# Patient Record
Sex: FEMALE | Race: WHITE | Employment: OTHER | URBAN - NONMETROPOLITAN AREA
[De-identification: names, ages, dates, MRNs, and addresses within clinical notes are randomized per-mention and may not be internally consistent; named-entity substitution may affect disease eponyms.]

---

## 2021-02-17 ENCOUNTER — HOSPITAL ENCOUNTER (OUTPATIENT)
Dept: WOMENS IMAGING | Age: 66
Discharge: HOME | End: 2021-02-17
Payer: MEDICARE

## 2021-02-17 DIAGNOSIS — Z78.0 POSTMENOPAUSAL STATUS: ICD-10-CM

## 2021-02-17 DIAGNOSIS — Z12.31 VISIT FOR SCREENING MAMMOGRAM: ICD-10-CM

## 2021-02-17 PROCEDURE — 77063 BREAST TOMOSYNTHESIS BI: CPT

## 2021-02-17 PROCEDURE — 77080 DXA BONE DENSITY AXIAL: CPT

## 2021-10-20 ENCOUNTER — HOSPITAL ENCOUNTER (OUTPATIENT)
Dept: NON INVASIVE DIAGNOSTICS | Age: 66
Discharge: HOME | End: 2021-10-20
Payer: MEDICARE

## 2021-10-20 DIAGNOSIS — R01.1 SYSTOLIC MURMUR: ICD-10-CM

## 2021-10-20 LAB
LV EF: 60 %
LVEF MODALITY: NORMAL

## 2021-10-20 PROCEDURE — 93306 TTE W/DOPPLER COMPLETE: CPT

## 2022-05-05 ENCOUNTER — HOSPITAL ENCOUNTER (OUTPATIENT)
Dept: WOMENS IMAGING | Age: 67
Discharge: HOME | End: 2022-05-05
Payer: MEDICARE

## 2022-05-05 DIAGNOSIS — Z12.31 VISIT FOR SCREENING MAMMOGRAM: ICD-10-CM

## 2022-05-05 PROCEDURE — 77063 BREAST TOMOSYNTHESIS BI: CPT

## 2023-08-28 ENCOUNTER — HOSPITAL ENCOUNTER (OUTPATIENT)
Dept: WOMENS IMAGING | Age: 68
Discharge: HOME | End: 2023-08-28
Payer: MEDICARE

## 2023-08-28 VITALS — WEIGHT: 150 LBS | BODY MASS INDEX: 28.32 KG/M2 | HEIGHT: 61 IN

## 2023-08-28 DIAGNOSIS — Z12.31 VISIT FOR SCREENING MAMMOGRAM: ICD-10-CM

## 2023-08-28 PROCEDURE — 77063 BREAST TOMOSYNTHESIS BI: CPT

## 2024-08-29 ENCOUNTER — HOSPITAL ENCOUNTER (OUTPATIENT)
Dept: WOMENS IMAGING | Age: 69
Discharge: HOME | End: 2024-08-29
Payer: MEDICARE

## 2024-08-29 DIAGNOSIS — Z12.31 VISIT FOR SCREENING MAMMOGRAM: ICD-10-CM

## 2024-08-29 PROCEDURE — 77063 BREAST TOMOSYNTHESIS BI: CPT

## 2024-09-16 ENCOUNTER — HOSPITAL ENCOUNTER (OUTPATIENT)
Dept: WOMENS IMAGING | Age: 69
Discharge: HOME | End: 2024-09-16
Attending: RADIOLOGY
Payer: MEDICARE

## 2024-09-16 DIAGNOSIS — R92.8 ABNORMAL MAMMOGRAM: ICD-10-CM

## 2024-09-16 PROCEDURE — G0279 TOMOSYNTHESIS, MAMMO: HCPCS

## 2024-09-16 PROCEDURE — 76642 ULTRASOUND BREAST LIMITED: CPT

## 2024-09-19 ENCOUNTER — HOSPITAL ENCOUNTER (OUTPATIENT)
Dept: WOMENS IMAGING | Age: 69
Discharge: HOME | End: 2024-09-19
Attending: RADIOLOGY
Payer: MEDICARE

## 2024-09-19 DIAGNOSIS — N63.15 MASS OVERLAPPING MULTIPLE QUADRANTS OF RIGHT BREAST: ICD-10-CM

## 2024-09-19 DIAGNOSIS — N63.11 MASS OF UPPER OUTER QUADRANT OF RIGHT BREAST: ICD-10-CM

## 2024-09-19 DIAGNOSIS — R92.30 BREAST DENSITY: ICD-10-CM

## 2024-09-19 PROCEDURE — 2709999900 MAM STEREO BREAST BX W LOC DEVICE 1ST LESION RIGHT

## 2024-09-19 PROCEDURE — A4648 IMPLANTABLE TISSUE MARKER: HCPCS

## 2024-09-19 PROCEDURE — G0279 TOMOSYNTHESIS, MAMMO: HCPCS

## 2024-09-23 ENCOUNTER — CLINICAL DOCUMENTATION (OUTPATIENT)
Dept: WOMENS IMAGING | Age: 69
End: 2024-09-23

## 2024-09-25 DIAGNOSIS — D05.01 LOBULAR CARCINOMA IN SITU (LCIS) OF RIGHT BREAST: Primary | ICD-10-CM

## 2024-10-04 PROBLEM — M19.012 ARTHRITIS OF LEFT ACROMIOCLAVICULAR JOINT: Status: ACTIVE | Noted: 2024-10-04

## 2024-10-08 ENCOUNTER — HOSPITAL ENCOUNTER (OUTPATIENT)
Dept: MRI IMAGING | Age: 69
Discharge: HOME | End: 2024-10-08
Attending: SURGERY
Payer: MEDICARE

## 2024-10-08 DIAGNOSIS — D05.01 LOBULAR CARCINOMA IN SITU (LCIS) OF RIGHT BREAST: ICD-10-CM

## 2024-10-08 LAB — POC CREATININE WHOLE BLOOD: 1.2 MG/DL (ref 0.5–1.2)

## 2024-10-08 PROCEDURE — C8908 MRI W/O FOL W/CONT, BREAST,: HCPCS

## 2024-10-08 PROCEDURE — 82565 ASSAY OF CREATININE: CPT

## 2024-10-08 PROCEDURE — 6360000004 HC RX CONTRAST MEDICATION: Performed by: SURGERY

## 2024-10-08 PROCEDURE — A9579 GAD-BASE MR CONTRAST NOS,1ML: HCPCS | Performed by: SURGERY

## 2024-10-08 RX ADMIN — GADOTERIDOL 15 ML: 279.3 INJECTION, SOLUTION INTRAVENOUS at 09:17

## 2024-10-09 NOTE — PROGRESS NOTES
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 Posterior depth.     HISTORY: 68 years, Female, Lobular carcinoma in situ of right breast     Tyrer-Cuzick calculation reports this patient's lifetime risk is 31.18%.     COMPARISON: 9/19/2024, 9/16/2024, 8/19/2024.     TECHNIQUE: Axial and coronal T2 STIR, sagittal T1, axial T1 fat saturated pre  and postcontrast dynamic imaging was performed of both breasts. Intravenous  ProHance gadolinium was given. Images were reviewed on a separate dedicated 3-D  workstation and time intensity curves were analyzed.     FINDINGS:     Amount of Fibroglandular Tissue: The breast parenchyma has scattered  fibroglandular elements.  Background Parenchymal Enhancement: Minimal     Right breast: The coil biopsy clip is identified in the upper outer right  breast, posterior depth. This is best seen on the axial STIR sequence. At this  site, there is no abnormal enhancement. There is no mass lesion. There are no  suspicious findings. The barbell clip is seen in the upper outer right breast  more anteriorly. There is no abnormal enhancement at this site. There are no  areas of suspicious enhancement. There is no suspicious morphology. No dominant  lesions are present.     Left breast: There are no areas of suspicious enhancement. There is no  suspicious morphology. No dominant lesions are present.     Right axillary lymph nodes are within acceptable limits. There are mildly  enlarged left axillary lymph nodes. Multiple prominent left axillary lymph nodes  are seen. These are best seen on the axial STIR sequence.      There are severe degenerative changes in both shoulders. There are bilateral  shoulder joint effusions. There is bone marrow edema in the glenohumeral joints  bilaterally. The patient was scanned with her arms down.           IMPRESSION:     1. No abnormal enhancement in either

## 2024-10-10 ENCOUNTER — OFFICE VISIT (OUTPATIENT)
Dept: SURGERY | Age: 69
End: 2024-10-10
Payer: MEDICARE

## 2024-10-10 ENCOUNTER — HOSPITAL ENCOUNTER (OUTPATIENT)
Age: 69
Discharge: HOME | End: 2024-10-10
Payer: MEDICARE

## 2024-10-10 ENCOUNTER — TELEPHONE (OUTPATIENT)
Dept: SURGERY | Age: 69
End: 2024-10-10

## 2024-10-10 ENCOUNTER — HOSPITAL ENCOUNTER (OUTPATIENT)
Dept: WOMENS IMAGING | Age: 69
Discharge: HOME | End: 2024-10-10
Attending: RADIOLOGY
Payer: MEDICARE

## 2024-10-10 VITALS
SYSTOLIC BLOOD PRESSURE: 124 MMHG | DIASTOLIC BLOOD PRESSURE: 70 MMHG | OXYGEN SATURATION: 96 % | RESPIRATION RATE: 18 BRPM | HEIGHT: 61 IN | BODY MASS INDEX: 26.81 KG/M2 | HEART RATE: 88 BPM | TEMPERATURE: 97.3 F | WEIGHT: 142 LBS

## 2024-10-10 DIAGNOSIS — R92.8 ABNORMAL FINDING ON BREAST IMAGING: ICD-10-CM

## 2024-10-10 DIAGNOSIS — E11.69 TYPE 2 DIABETES MELLITUS WITH OTHER SPECIFIED COMPLICATION, WITHOUT LONG-TERM CURRENT USE OF INSULIN (HCC): ICD-10-CM

## 2024-10-10 DIAGNOSIS — I10 ESSENTIAL HYPERTENSION: ICD-10-CM

## 2024-10-10 DIAGNOSIS — D05.01 PLEOMORPHIC LOBULAR CARCINOMA IN SITU (LCIS) OF RIGHT BREAST: ICD-10-CM

## 2024-10-10 DIAGNOSIS — D05.01 PLEOMORPHIC LOBULAR CARCINOMA IN SITU (LCIS) OF RIGHT BREAST: Primary | ICD-10-CM

## 2024-10-10 DIAGNOSIS — R59.0 ENLARGED LYMPH NODES IN ARMPIT: ICD-10-CM

## 2024-10-10 LAB
ANION GAP SERPL CALC-SCNC: 17 MEQ/L (ref 8–16)
BUN SERPL-MCNC: 25 MG/DL (ref 7–22)
CALCIUM SERPL-MCNC: 9.9 MG/DL (ref 8.5–10.5)
CHLORIDE SERPL-SCNC: 97 MEQ/L (ref 98–111)
CO2 SERPL-SCNC: 24 MEQ/L (ref 23–33)
CREAT SERPL-MCNC: 1 MG/DL (ref 0.4–1.2)
EKG ATRIAL RATE: 84 BPM
EKG P AXIS: 52 DEGREES
EKG P-R INTERVAL: 136 MS
EKG Q-T INTERVAL: 356 MS
EKG QRS DURATION: 74 MS
EKG QTC CALCULATION (BAZETT): 420 MS
EKG R AXIS: 48 DEGREES
EKG T AXIS: 79 DEGREES
EKG VENTRICULAR RATE: 84 BPM
GFR SERPL CREATININE-BSD FRML MDRD: 61 ML/MIN/1.73M2
GLUCOSE SERPL-MCNC: 157 MG/DL (ref 70–108)
HCT VFR BLD AUTO: 42.8 % (ref 37–47)
HGB BLD-MCNC: 13.5 GM/DL (ref 12–16)
POTASSIUM SERPL-SCNC: 4.9 MEQ/L (ref 3.5–5.2)
SODIUM SERPL-SCNC: 138 MEQ/L (ref 135–145)

## 2024-10-10 PROCEDURE — 85018 HEMOGLOBIN: CPT

## 2024-10-10 PROCEDURE — 99205 OFFICE O/P NEW HI 60 MIN: CPT | Performed by: SURGERY

## 2024-10-10 PROCEDURE — 1123F ACP DISCUSS/DSCN MKR DOCD: CPT | Performed by: SURGERY

## 2024-10-10 PROCEDURE — 80048 BASIC METABOLIC PNL TOTAL CA: CPT

## 2024-10-10 PROCEDURE — 93005 ELECTROCARDIOGRAM TRACING: CPT

## 2024-10-10 PROCEDURE — 85014 HEMATOCRIT: CPT

## 2024-10-10 PROCEDURE — 3078F DIAST BP <80 MM HG: CPT | Performed by: SURGERY

## 2024-10-10 PROCEDURE — 36415 COLL VENOUS BLD VENIPUNCTURE: CPT

## 2024-10-10 PROCEDURE — 3074F SYST BP LT 130 MM HG: CPT | Performed by: SURGERY

## 2024-10-10 PROCEDURE — 76882 US LMTD JT/FCL EVL NVASC XTR: CPT

## 2024-10-10 RX ORDER — GLIMEPIRIDE 2 MG/1
2 TABLET ORAL
COMMUNITY

## 2024-10-10 RX ORDER — LISINOPRIL AND HYDROCHLOROTHIAZIDE 20; 25 MG/1; MG/1
1 TABLET ORAL DAILY
COMMUNITY
Start: 2024-08-08

## 2024-10-10 ASSESSMENT — ENCOUNTER SYMPTOMS: GASTROINTESTINAL NEGATIVE: 1

## 2024-10-10 NOTE — TELEPHONE ENCOUNTER
Patient scheduled for surgery with Dr. Ott on 10- at 1:00pm with an arrival of 10:00am at [facility].  Preop orders (H/H, BMP, EKG) and surgery instructions given.  Surgery consent signed.

## 2024-10-10 NOTE — TELEPHONE ENCOUNTER
Surgery Scheduling Form   [facility] [address], [place] [address]    Phone * [phone]   Surgical Scheduling Direct line Phone * [phone]  Fax * [phone]      Rachna Sanabria      1955    female    [address] [address]  Marital Status:         Home Phone: [phone]   Cell Phone:   Telephone Information:   Mobile [phone]              Surgeon: Dr. Ott  Surgery Date:10-   Time: TF Marva     Procedure: Right breast lumpectomy with preop needle localization     Outpatient     Diagnosis: Right breast lobular carcinoma in situ    Important Medical History: In Epic    Special Inst/Equip:     CPT Codes: 84112    Latex Allergy:   no Cardiac Device:  no    Anesthesia Type: MAC    Case Location:  Main OR     Preadmission Testing: Phone Call      PAT Date and Time: ________________________________    PAT Confirmation #: _________________________________    Post Op Visit:  ______________________________________    Need Preop Cardiac Clearance:   no    Does patient have Cardiologist/physician? No      Surgery Conformation #:  ______________________________________________    : __________________________________ Date:____________________        Insurance Company Name:  [employer]

## 2024-10-10 NOTE — TELEPHONE ENCOUNTER
Per [employer]    No Authorization Required  Place of Service  22 - On [facility]  Service From - To Date  NA    Admission Type  9    Diagnosis Code 1   - Lobular carcinoma in situ of right breast    Procedure Code 1  99464  Quantity  1 Units  Procedure From - To Date  2024-10-16    Status  NO AUTH REQUIRED  Message  No precert required.

## 2024-10-14 ENCOUNTER — PREP FOR PROCEDURE (OUTPATIENT)
Dept: SURGERY | Age: 69
End: 2024-10-14

## 2024-10-14 ENCOUNTER — SOCIAL WORK (OUTPATIENT)
Dept: INFUSION THERAPY | Age: 69
End: 2024-10-14

## 2024-10-14 RX ORDER — SODIUM CHLORIDE 9 MG/ML
INJECTION, SOLUTION INTRAVENOUS CONTINUOUS
Status: CANCELLED | OUTPATIENT
Start: 2024-10-14

## 2024-10-14 NOTE — PROGRESS NOTES
Oncology Social Work    Date: 10/14/2024  Time: 11:47 AM  Name: Rachna Sanabria  MRN: [id number]     Contact Type: Follow-up    Note:   Situation: This staff called Rachna Sanabria via phone support to introduce myself as her Oncology Social Worker.     Background:  Rachna was referred to this staff by the General Surgery Dept since she had her recent appointment there. This staff was calling to review her outstanding concerns she may have identified on her Distress Thermometer.     Assessment: This staff had the opportunity to speak with Rachna. She appeared very pleasant as we discussed the services that are provided by the , which include Medicaid, Medicare applications., FMLA paperwork, or Disability applications.    - I also went on to explain, I can assist in getting her Power of  documents completed when she comes in for a future appt at any of her providers office, when in the area, so she doesn't need to make a special trip.   - No community referrals were placed now, but we will remain open to opportunities should she pursue that avenue.    Recommendation: Follow-up will be initiated by Rachna based on need.  provided her with my contact information and will remain available for support.        JASMINA Morales, LANCE, MARLENE  Oncology Social Worker      Electronically signed by JASMINA Morales, LANCE, MARLENE on 10/14/2024 at 11:47 AM

## 2024-10-16 ENCOUNTER — ANESTHESIA (OUTPATIENT)
Dept: OPERATING ROOM | Age: 69
End: 2024-10-16
Payer: MEDICARE

## 2024-10-16 ENCOUNTER — ANESTHESIA EVENT (OUTPATIENT)
Dept: OPERATING ROOM | Age: 69
End: 2024-10-16
Payer: MEDICARE

## 2024-10-16 ENCOUNTER — HOSPITAL ENCOUNTER (OUTPATIENT)
Dept: WOMENS IMAGING | Age: 69
Discharge: HOME | End: 2024-10-16
Payer: MEDICARE

## 2024-10-16 ENCOUNTER — HOSPITAL ENCOUNTER (OUTPATIENT)
Age: 69
Setting detail: OUTPATIENT SURGERY
Discharge: HOME | End: 2024-10-16
Attending: SURGERY | Admitting: SURGERY
Payer: MEDICARE

## 2024-10-16 ENCOUNTER — HOSPITAL ENCOUNTER (OUTPATIENT)
Dept: WOMENS IMAGING | Age: 69
Discharge: HOME | End: 2024-10-16
Attending: SURGERY
Payer: MEDICARE

## 2024-10-16 VITALS
HEIGHT: 60 IN | HEART RATE: 88 BPM | OXYGEN SATURATION: 93 % | SYSTOLIC BLOOD PRESSURE: 147 MMHG | BODY MASS INDEX: 27.41 KG/M2 | DIASTOLIC BLOOD PRESSURE: 80 MMHG | RESPIRATION RATE: 16 BRPM | TEMPERATURE: 98.1 F | WEIGHT: 139.6 LBS

## 2024-10-16 DIAGNOSIS — D05.01 PLEOMORPHIC LOBULAR CARCINOMA IN SITU (LCIS) OF RIGHT BREAST: ICD-10-CM

## 2024-10-16 DIAGNOSIS — D05.01: ICD-10-CM

## 2024-10-16 DIAGNOSIS — D05.01 LOBULAR CARCINOMA IN SITU OF RIGHT BREAST: ICD-10-CM

## 2024-10-16 PROCEDURE — 3600000012 HC SURGERY LEVEL 2 ADDTL 15MIN: Performed by: SURGERY

## 2024-10-16 PROCEDURE — 6360000002 HC RX W HCPCS

## 2024-10-16 PROCEDURE — 88307 TISSUE EXAM BY PATHOLOGIST: CPT

## 2024-10-16 PROCEDURE — 7100000000 HC PACU RECOVERY - FIRST 15 MIN: Performed by: SURGERY

## 2024-10-16 PROCEDURE — 3700000001 HC ADD 15 MINUTES (ANESTHESIA): Performed by: SURGERY

## 2024-10-16 PROCEDURE — 2709999900 HC NON-CHARGEABLE SUPPLY: Performed by: SURGERY

## 2024-10-16 PROCEDURE — 2580000003 HC RX 258: Performed by: NURSE PRACTITIONER

## 2024-10-16 PROCEDURE — 7100000001 HC PACU RECOVERY - ADDTL 15 MIN: Performed by: SURGERY

## 2024-10-16 PROCEDURE — 3600000002 HC SURGERY LEVEL 2 BASE: Performed by: SURGERY

## 2024-10-16 PROCEDURE — 2500000003 HC RX 250 WO HCPCS

## 2024-10-16 PROCEDURE — 19285 PERQ DEV BREAST 1ST US IMAG: CPT

## 2024-10-16 PROCEDURE — G0279 TOMOSYNTHESIS, MAMMO: HCPCS

## 2024-10-16 PROCEDURE — 88305 TISSUE EXAM BY PATHOLOGIST: CPT

## 2024-10-16 PROCEDURE — 6360000002 HC RX W HCPCS: Performed by: NURSE PRACTITIONER

## 2024-10-16 PROCEDURE — 76098 X-RAY EXAM SURGICAL SPECIMEN: CPT

## 2024-10-16 PROCEDURE — 7100000010 HC PHASE II RECOVERY - FIRST 15 MIN: Performed by: SURGERY

## 2024-10-16 PROCEDURE — 7100000011 HC PHASE II RECOVERY - ADDTL 15 MIN: Performed by: SURGERY

## 2024-10-16 PROCEDURE — 3700000000 HC ANESTHESIA ATTENDED CARE: Performed by: SURGERY

## 2024-10-16 RX ORDER — ONDANSETRON 2 MG/ML
INJECTION INTRAMUSCULAR; INTRAVENOUS
Status: DISCONTINUED | OUTPATIENT
Start: 2024-10-16 | End: 2024-10-16 | Stop reason: SDUPTHER

## 2024-10-16 RX ORDER — FENTANYL CITRATE 50 UG/ML
INJECTION, SOLUTION INTRAMUSCULAR; INTRAVENOUS
Status: DISCONTINUED | OUTPATIENT
Start: 2024-10-16 | End: 2024-10-16 | Stop reason: SDUPTHER

## 2024-10-16 RX ORDER — DEXAMETHASONE SODIUM PHOSPHATE 10 MG/ML
INJECTION, EMULSION INTRAMUSCULAR; INTRAVENOUS
Status: DISCONTINUED | OUTPATIENT
Start: 2024-10-16 | End: 2024-10-16 | Stop reason: SDUPTHER

## 2024-10-16 RX ORDER — ROCURONIUM BROMIDE 10 MG/ML
INJECTION, SOLUTION INTRAVENOUS
Status: DISCONTINUED | OUTPATIENT
Start: 2024-10-16 | End: 2024-10-16 | Stop reason: SDUPTHER

## 2024-10-16 RX ORDER — SODIUM CHLORIDE 9 MG/ML
INJECTION, SOLUTION INTRAVENOUS CONTINUOUS
Status: DISCONTINUED | OUTPATIENT
Start: 2024-10-16 | End: 2024-10-16 | Stop reason: HOSPADM

## 2024-10-16 RX ORDER — HYDROCODONE BITARTRATE AND ACETAMINOPHEN 5; 325 MG/1; MG/1
1 TABLET ORAL EVERY 6 HOURS PRN
Qty: 12 TABLET | Refills: 0 | Status: SHIPPED | OUTPATIENT
Start: 2024-10-16 | End: 2024-10-19

## 2024-10-16 RX ORDER — PHENYLEPHRINE HCL IN 0.9% NACL 1 MG/10 ML
SYRINGE (ML) INTRAVENOUS
Status: DISCONTINUED | OUTPATIENT
Start: 2024-10-16 | End: 2024-10-16 | Stop reason: SDUPTHER

## 2024-10-16 RX ORDER — MIDAZOLAM HYDROCHLORIDE 1 MG/ML
INJECTION INTRAMUSCULAR; INTRAVENOUS
Status: DISCONTINUED | OUTPATIENT
Start: 2024-10-16 | End: 2024-10-16 | Stop reason: SDUPTHER

## 2024-10-16 RX ORDER — PROPOFOL 10 MG/ML
INJECTION, EMULSION INTRAVENOUS
Status: DISCONTINUED | OUTPATIENT
Start: 2024-10-16 | End: 2024-10-16 | Stop reason: SDUPTHER

## 2024-10-16 RX ADMIN — Medication 200 MCG: at 14:26

## 2024-10-16 RX ADMIN — FENTANYL CITRATE 25 MCG: 50 INJECTION, SOLUTION INTRAMUSCULAR; INTRAVENOUS at 15:14

## 2024-10-16 RX ADMIN — FENTANYL CITRATE 50 MCG: 50 INJECTION, SOLUTION INTRAMUSCULAR; INTRAVENOUS at 15:10

## 2024-10-16 RX ADMIN — SODIUM CHLORIDE: 9 INJECTION, SOLUTION INTRAVENOUS at 11:51

## 2024-10-16 RX ADMIN — ONDANSETRON 4 MG: 2 INJECTION INTRAMUSCULAR; INTRAVENOUS at 14:50

## 2024-10-16 RX ADMIN — PROPOFOL 50 MG: 10 INJECTION, EMULSION INTRAVENOUS at 14:12

## 2024-10-16 RX ADMIN — FENTANYL CITRATE 25 MCG: 50 INJECTION, SOLUTION INTRAMUSCULAR; INTRAVENOUS at 14:23

## 2024-10-16 RX ADMIN — SUGAMMADEX 200 MG: 100 INJECTION, SOLUTION INTRAVENOUS at 15:10

## 2024-10-16 RX ADMIN — PROPOFOL 100 MG: 10 INJECTION, EMULSION INTRAVENOUS at 14:23

## 2024-10-16 RX ADMIN — WATER 2000 MG: 1 INJECTION INTRAMUSCULAR; INTRAVENOUS; SUBCUTANEOUS at 14:13

## 2024-10-16 RX ADMIN — DEXAMETHASONE SODIUM PHOSPHATE 10 MG: 10 INJECTION, EMULSION INTRAMUSCULAR; INTRAVENOUS at 14:38

## 2024-10-16 RX ADMIN — ROCURONIUM BROMIDE 50 MG: 10 INJECTION INTRAVENOUS at 14:23

## 2024-10-16 RX ADMIN — PROPOFOL 50 MCG/KG/MIN: 10 INJECTION, EMULSION INTRAVENOUS at 14:13

## 2024-10-16 RX ADMIN — MIDAZOLAM 2 MG: 1 INJECTION INTRAMUSCULAR; INTRAVENOUS at 14:07

## 2024-10-16 RX ADMIN — Medication 200 MCG: at 14:33

## 2024-10-16 ASSESSMENT — PAIN - FUNCTIONAL ASSESSMENT
PAIN_FUNCTIONAL_ASSESSMENT: 0-10
PAIN_FUNCTIONAL_ASSESSMENT: NONE - DENIES PAIN

## 2024-10-16 ASSESSMENT — ENCOUNTER SYMPTOMS: GASTROINTESTINAL NEGATIVE: 1

## 2024-10-16 NOTE — ANESTHESIA PRE PROCEDURE
Department of Anesthesiology  Preprocedure Note       Name:  Rachna Sanabria   Age:  68 y.o.  :  1955                                          MRN:  [id number]         Date:  10/16/2024      Surgeon: Surgeon(s):  Amena Ott MD    Procedure: Procedure(s):  RIGHT BREAST LUMPECTOMY W/ PREOP NEEDLE LOCALIZATION    Medications prior to admission:   Prior to Admission medications    Medication Sig Start Date End Date Taking? Authorizing Provider   lisinopril-hydroCHLOROthiazide (PRINZIDE;ZESTORETIC) 20-25 MG per tablet Take 1 tablet by mouth daily 24   ProviderNayely MD   glimepiride (AMARYL) 2 MG tablet Take 1 tablet by mouth every morning (before breakfast)    Provider, MD Nayely       Current medications:    Current Facility-Administered Medications   Medication Dose Route Frequency Provider Last Rate Last Admin   • 0.9 % sodium chloride infusion   IntraVENous Continuous Mercy Ocasio APRN - CNP       • ceFAZolin (ANCEF) 2,000 mg in sterile water 20 mL IV syringe  2,000 mg IntraVENous On Call to OR Mercy Ocasio APRN - CNP           Allergies:    Allergies   Allergen Reactions   • Codeine Hives and Anxiety       Problem List:    Patient Active Problem List   Diagnosis Code   • Arthritis of left acromioclavicular joint M19.012       Past Medical History:        Diagnosis Date   • Leukemia (HCC)    • Lobular carcinoma in situ            Past Surgical History:        Procedure Laterality Date   • CHOLECYSTECTOMY     • FINGER AMPUTATION Left     re-attached   • JENNIFFER STEROTACTIC LOC BREAST BIOPSY RIGHT Right 2024    [facility] STEROTACTIC LOC BREAST BIOPSY RIGHT 2024 [facility]   • [facility] US GUID NDL BIOPSY RIGHT Right 2024    [facility] US GUID NDL BIOPSY RIGHT 2024 Danielito Zimmerman MD [facility]       Social History:    Social History     Tobacco Use   • Smoking status: Former   • Smokeless tobacco: Never   Substance Use Topics   • Alcohol use:

## 2024-10-16 NOTE — H&P
Amena Ott MD  General Surgery Clinic H&P    Pt Name: Rachna Sanabria  MRN: [id number]  YOB: 1955  Date of evaluation: 10/10/2024    Primary Care Physician: Corona Odom MD  Patient evaluated at the request of  womens wellness  Reason for evaluation: Pleomorphic LCIS  IMPRESSIONS:       ICD-10-CM    1. Pleomorphic lobular carcinoma in situ (LCIS) of right breast  D05.01 Basic Metabolic Panel     Hemoglobin and Hematocrit     EKG 12 Lead     JENNIFFER US GUIDED PLACE LOC DEVICE PERC 1ST LESION      2. Essential hypertension  I10 Basic Metabolic Panel     Hemoglobin and Hematocrit     EKG 12 Lead      3. Type 2 diabetes mellitus with other specified complication, without long-term current use of insulin (HCC)  E11.69 Basic Metabolic Panel     Hemoglobin and Hematocrit     EKG 12 Lead        does not have any pertinent problems on file.    PLANS:   I discussed with the patient assessment, discussed reason for excision with LCIS and scenario.  Discussed with her that it does provide increased risk of bilateral breast cancers just having LCIS and discussed potential of having chemoprophylaxis.  I discussed with patient surgery how was performed, the risks of benefits the alternatives risk including but not limited to bleeding infection demonstrating structures need for procedures patient understands like to proceed with needle localized lumpectomy.  Patient will follow-up with Dr. Cobb to discuss chemoprophylaxis.  Patient is established with her ongoing treatment for CLL    SUBJECTIVE:   CC: Breast lesion    History of Chief Complaint:    Rachna is a 68 y.o.female who comes in today for evaluation of recent screening mammogram that found right breast calcifications biopsy consistent with LCIS with pleomorphic features.  Patient has not noticed any palpable masses any lesions.      right breast 10 o'clock position 2 cm from  the nipple measuring 0.9 x 0.3 x 0.4 cm      Patient has no

## 2024-10-16 NOTE — ANESTHESIA POSTPROCEDURE EVALUATION
Department of Anesthesiology  Postprocedure Note    Patient: Rachna Sanabria  MRN: [id number]  YOB: 1955  Date of evaluation: 10/16/2024    Procedure Summary       Date: 10/16/24 Room / Location: [facility] OR 03 / [facility] OR    Anesthesia Start: 1406 Anesthesia Stop: 1518    Procedure: RIGHT BREAST LUMPECTOMY W/ PREOP NEEDLE LOCALIZATION (Right: Breast) Diagnosis:       Lobular carcinoma in situ of right breast      (Lobular carcinoma in situ of right breast [D05.01])    Surgeons: Amena Ott MD Responsible Provider: Aamir Garcia MD    Anesthesia Type: general ASA Status: 2            Anesthesia Type: No value filed.    Desire Phase I: Desire Score: 9    Desire Phase II:      Anesthesia Post Evaluation    Patient location during evaluation: PACU  Patient participation: complete - patient participated  Level of consciousness: awake  Airway patency: patent  Nausea & Vomiting: no vomiting and no nausea  Cardiovascular status: hemodynamically stable  Respiratory status: acceptable  Hydration status: stable  Pain management: adequate    No notable events documented.

## 2024-10-16 NOTE — PROGRESS NOTES
Contact Type:  [facility]    Contact Information: Arrived with  for needle localization. Having breast surgery today with Dr. Ott.    Diagnosis: LCIS    Patient Expresses Need(s) For: n/a     Teaching Sheets/Booklets Provided: n/a    Notes: Procedure explained and questions addressed as needed.  Dr. Zimmerman placed wire. Secured with gauze and tape per protocol. Transported patient with belongings to [facility] via wheelchair at 1115.

## 2024-10-16 NOTE — PROGRESS NOTES

## 2024-10-16 NOTE — PROGRESS NOTES
1513 - Pt arrives to pacu at this time. Pt is awake and alert. Denies pain or nausea. Respirs easy and unlabored on 6L simple mask. VSS.     1520 - Placed on 4L NC. Pt continues to deny needs. VSS.    1530 - NC decreased to 2L NC. Pt states pain is tolerable at this time. Denies needs. VSS.    1540 - Pt continues to deny needs. VSS.    1543 - Pt meets criteria to discharge from pacu at this time. Pt states pain is tolerable. Respirs unlabored on 2L NC. VSS. [facility]. Family at bedside.

## 2024-10-16 NOTE — PROGRESS NOTES
Pt returned to [facility] room 12. Vitals and assessment as charted. 0.9 infusing, \to count from PACU. Pt has crackers and water. Family at the bedside. Pt and family verbalized understanding of discharge criteria and call light use. Call light in reach.

## 2024-10-16 NOTE — PROGRESS NOTES
Pt admitted to [facility] room 12 and oriented to unit. SCD sleeves applied. Nares swabbed. Pt verbalized permission for first name, last initial and physicians name on white board. SDS board and discharge criteria explained, pt and family verbalized understanding. Pt denies thoughts of harming self or others. Call light in reach. Family at the bedside.  Carlos [phone]

## 2024-10-16 NOTE — DISCHARGE INSTRUCTIONS
DR. CHAU'S DISCHARGE INSTRUCTIONS    Pt Name: Rachna Sanabria  Medical Record Number: [id number]  Today's Date: 10/16/2024  GENERAL ANESTHESIA OR SEDATION   1. Do not drive or operate hazardous machinery for 24 hours.  2. Do not make important business or personal decisions for 24 hours.  3. Do not drink alcoholic beverages or use tobacco for 24 hours.  ACTIVITY INSTRUCTIONS   Rest today. Resume light to normal activity tomorrow.  You may resume normal activity tomorrow. Do not engage in strenuous activity that may place stress on your incision.  Do not drive for 3-5 days and avoid heavy lifting, tugging, pullings greater than 10-20 lbs until seen in the office.    DIET INSTRUCTIONS   Begin with clear liquids. If not nauseated, may increase to a low-fat diet when you desire. Greasy and spicy foods are not advised.  Regular diet as tolerated.  MEDICATIONS   Prescription written for norco. Take as directed.  Do not drink alcohol or drive while taking pain medications. You may experience dizziness or drowsiness with these medications. You may also experience constipation which can be relieved with stool softners or laxatives.  You may resume your daily prescription medication schedule unless otherwise specified.  Do not take 325mg Aspirin or other blood thinners such as Coumadin or Plavix for 5 days.  WOUND & DRESSING INSTRUCTIONS   Always ensure you and your care giver clean hands before and after caring for the wound.  Keep dressing clean and dry for 48 hours. Change when soiled or wet.      Allow steri-strips to fall off on their own if present, allow surgical glue to peel off on its own  Ice operative site for 20 minutes 4 times a day.     May wash over incision in shower in 48 hours, but do not soak in a bath.    BREAST PROCEDURES   Following breast procedures it is important to use supportive garments  It is also normal with sentinel lymph node biopsy for the urine to have a bluish color.     FOLLOW

## 2024-10-17 ENCOUNTER — TELEPHONE (OUTPATIENT)
Dept: SURGERY | Age: 69
End: 2024-10-17

## 2024-10-17 NOTE — OP NOTE
Operative Note      Patient: Rachna Sanabria  YOB: 1955  MRN: [id number]    Date of Procedure: 10/16/2024    Pre-Op Diagnosis Codes:      * Lobular carcinoma in situ of right breast [D05.01]    Post-Op Diagnosis: Same       Procedure(s):  RIGHT BREAST LUMPECTOMY W/ PREOP NEEDLE LOCALIZATION    Surgeon(s):  Amena Ott MD    Assistant:   First Assistant: Jomar Miller RN    Anesthesia: Monitor Anesthesia Care    Estimated Blood Loss (mL): 15 ml    Complications: None    Specimens:   ID Type Source Tests Collected by Time Destination   A : right breast lumpectomy Tissue Breast SURGICAL PATHOLOGY Amena Ott MD 10/16/2024 1443    B : nodule at 9 O'Clock Tissue Breast SURGICAL PATHOLOGY Amena Ott MD 10/16/2024 1450        Implants:  * No implants in log *      Drains: * No LDAs found *    Findings:  Infection Present At Time Of Surgery (PATOS) (choose all levels that have infection present):  No infection present  Other Findings:   Barbell clip seen in specimen on mammogram, likely fell out of other site when dividing tissue to get to deep siite    This procedure was not performed to treat cancer       Detailed Description of Procedure:   She was seen in the preoperative holding informed consent was reviewed.  Movement was wellness wire located in place.  The biopsy was taken specimen was deep, it traversed the site adjacent to where her superficial barbell clip was.  Images were reviewed.  Patient was taken the operating placed after table after adequate anesthesia formed was performed she is prepped and draped normal sterile fashion.  An incision was made approximately 3 cm out from her nipple, it was deepened with cautery.  While deepening the incision to get to the biopsy site and wire, the old biopsy site with barbell clip was encountered.  The tissue was dissected deeper, the wire was encountered and the fatty tissue was circumferentially dissected out around the wire

## 2024-10-24 ENCOUNTER — OFFICE VISIT (OUTPATIENT)
Dept: SURGERY | Age: 69
End: 2024-10-24

## 2024-10-24 VITALS
BODY MASS INDEX: 27.68 KG/M2 | TEMPERATURE: 97.1 F | HEART RATE: 73 BPM | RESPIRATION RATE: 18 BRPM | DIASTOLIC BLOOD PRESSURE: 74 MMHG | WEIGHT: 141 LBS | HEIGHT: 60 IN | OXYGEN SATURATION: 96 % | SYSTOLIC BLOOD PRESSURE: 124 MMHG

## 2024-10-24 DIAGNOSIS — D05.01 PLEOMORPHIC LOBULAR CARCINOMA IN SITU (LCIS) OF RIGHT BREAST: Primary | ICD-10-CM

## 2024-10-24 PROCEDURE — 99024 POSTOP FOLLOW-UP VISIT: CPT | Performed by: SURGERY

## 2024-11-05 NOTE — PROGRESS NOTES
formalin and consists of an   unoriented portion of soft yellow lobulated adipose tissue measuring   4.5 x 4.2 x 1.0 cm overall.  The outer surface of the specimen is   entirely inked black. Upon sectioning of the specimen, there is a   hard/firm tan-white area noted measuring 2.0 x 1.3 x 0.7 cm.  Remaining   breast parenchyma is soft, yellow, and lobulated.  Representative   tissue is submitted in four cassettes, labeled B1-B4, with the entire   firm/hard area in cassette B1-B2.  BAS:gb     Microscopic Examination:   A.  Sections demonstrate portions of breast parenchyma which are   involved by biopsy site changes including fibrosis, chronic   inflammation and fat necrosis.  There are two foci of residual   pleomorphic lobular carcinoma in situ.  The largest bands 4 mm.  The   inked margins are widely negative for LCIS (at least 5 mm).  There are   also fibrocystic changes including duct ectasia, fibroadenomatoid   change, stromal fibrosis, and usual intraductal hyperplasia.   Microcalcifications are present within benign breast parenchyma.  There   is no evidence of malignancy.     B.  Sections demonstrate portions of breast parenchyma which are   involved by dense stromal fibrosis and mild fibrocystic change   including usual intraductal hyperplasia and columnar cell change.   Biopsy site changes are present.  Microcalcifications are present   within benign breast parenchyma.  There is no evidence of cytologic   atypia or malignancy.       RADIOLOGY   na    Electronically signed by Amena Ott MD on 11/5/2024 at 2:11 PM

## 2025-01-13 ENCOUNTER — HOSPITAL ENCOUNTER (OUTPATIENT)
Dept: GENERAL RADIOLOGY | Age: 70
Discharge: HOME | End: 2025-01-13
Payer: MEDICARE

## 2025-01-13 ENCOUNTER — HOSPITAL ENCOUNTER (OUTPATIENT)
Age: 70
Discharge: HOME | End: 2025-01-13
Payer: MEDICARE

## 2025-01-13 DIAGNOSIS — Z01.818 PRE-OP EVALUATION: ICD-10-CM

## 2025-01-13 LAB
ANION GAP SERPL CALC-SCNC: 18 MEQ/L (ref 8–16)
BUN SERPL-MCNC: 25 MG/DL (ref 7–22)
CALCIUM SERPL-MCNC: 10.2 MG/DL (ref 8.5–10.5)
CHLORIDE SERPL-SCNC: 100 MEQ/L (ref 98–111)
CO2 SERPL-SCNC: 22 MEQ/L (ref 23–33)
CREAT SERPL-MCNC: 1 MG/DL (ref 0.4–1.2)
EKG ATRIAL RATE: 96 BPM
EKG P AXIS: 72 DEGREES
EKG P-R INTERVAL: 264 MS
EKG Q-T INTERVAL: 344 MS
EKG QRS DURATION: 76 MS
EKG QTC CALCULATION (BAZETT): 430 MS
EKG R AXIS: 65 DEGREES
EKG T AXIS: 75 DEGREES
EKG VENTRICULAR RATE: 94 BPM
GFR SERPL CREATININE-BSD FRML MDRD: 61 ML/MIN/1.73M2
GLUCOSE SERPL-MCNC: 138 MG/DL (ref 70–108)
INR PPP: 1.03 (ref 0.85–1.13)
POTASSIUM SERPL-SCNC: 4.7 MEQ/L (ref 3.5–5.2)
SCAN OF BLOOD SMEAR: NORMAL
SODIUM SERPL-SCNC: 140 MEQ/L (ref 135–145)

## 2025-01-13 PROCEDURE — 85025 COMPLETE CBC W/AUTO DIFF WBC: CPT

## 2025-01-13 PROCEDURE — 71046 X-RAY EXAM CHEST 2 VIEWS: CPT

## 2025-01-13 PROCEDURE — 83036 HEMOGLOBIN GLYCOSYLATED A1C: CPT

## 2025-01-13 PROCEDURE — 93010 ELECTROCARDIOGRAM REPORT: CPT | Performed by: INTERNAL MEDICINE

## 2025-01-13 PROCEDURE — 80048 BASIC METABOLIC PNL TOTAL CA: CPT

## 2025-01-13 PROCEDURE — 93005 ELECTROCARDIOGRAM TRACING: CPT | Performed by: FAMILY MEDICINE

## 2025-01-13 PROCEDURE — 85610 PROTHROMBIN TIME: CPT

## 2025-01-13 PROCEDURE — 87641 MR-STAPH DNA AMP PROBE: CPT

## 2025-01-13 PROCEDURE — 36415 COLL VENOUS BLD VENIPUNCTURE: CPT

## 2025-01-14 LAB
AUTO DIFF PNL BLD: ABNORMAL
BASOPHILS ABSOLUTE: 0.1 THOU/MM3 (ref 0–0.1)
BASOPHILS NFR BLD AUTO: 0.5 %
DEPRECATED MEAN GLUCOSE BLD GHB EST-ACNC: 156 MG/DL (ref 70–126)
DEPRECATED RDW RBC AUTO: 50 FL (ref 35–45)
EOSINOPHIL NFR BLD AUTO: 0.5 %
EOSINOPHILS ABSOLUTE: 0.1 THOU/MM3 (ref 0–0.4)
ERYTHROCYTE [DISTWIDTH] IN BLOOD BY AUTOMATED COUNT: 14.8 % (ref 11.5–14.5)
HBA1C MFR BLD HPLC: 7.2 % (ref 4.4–6.4)
HCT VFR BLD AUTO: 42.5 % (ref 37–47)
HGB BLD-MCNC: 13.6 GM/DL (ref 12–16)
IMM GRANULOCYTES # BLD AUTO: 0.13 THOU/MM3 (ref 0–0.07)
IMM GRANULOCYTES NFR BLD AUTO: 0.6 %
LYMPHOCYTES ABSOLUTE: 11.6 THOU/MM3 (ref 1–4.8)
LYMPHOCYTES NFR BLD AUTO: 53 %
MCH RBC QN AUTO: 29.6 PG (ref 26–33)
MCHC RBC AUTO-ENTMCNC: 32 GM/DL (ref 32.2–35.5)
MCV RBC AUTO: 92.4 FL (ref 81–99)
MONOCYTES ABSOLUTE: 1.1 THOU/MM3 (ref 0.4–1.3)
MONOCYTES NFR BLD AUTO: 5.1 %
MRSA DNA SPEC QL NAA+PROBE: POSITIVE
NEUTROPHILS ABSOLUTE: 8.8 THOU/MM3 (ref 1.8–7.7)
NEUTROPHILS NFR BLD AUTO: 40.3 %
NRBC BLD AUTO-RTO: 0 /100 WBC
PATHOLOGIST REVIEW: ABNORMAL
PLATELET # BLD AUTO: 516 THOU/MM3 (ref 130–400)
PMV BLD AUTO: 9.7 FL (ref 9.4–12.4)
RBC # BLD AUTO: 4.6 MILL/MM3 (ref 4.2–5.4)
SMUDGE CELLS BLD QL SMEAR: PRESENT
TOXIC GRANULES BLD QL SMEAR: PRESENT
VARIANT LYMPHS BLD QL SMEAR: ABNORMAL %
WBC # BLD AUTO: 21.8 THOU/MM3 (ref 4.8–10.8)

## 2025-01-23 ENCOUNTER — TRANSCRIBE ORDERS (OUTPATIENT)
Dept: ADMINISTRATIVE | Age: 70
End: 2025-01-23

## 2025-01-23 DIAGNOSIS — R07.89 OTHER CHEST PAIN: Primary | ICD-10-CM

## 2025-01-23 DIAGNOSIS — R09.89 ABNORMAL CHEST SOUNDS: Primary | ICD-10-CM

## 2025-01-29 ENCOUNTER — HOSPITAL ENCOUNTER (OUTPATIENT)
Dept: INTERVENTIONAL RADIOLOGY/VASCULAR | Age: 70
Discharge: HOME | End: 2025-01-31
Attending: FAMILY MEDICINE
Payer: MEDICARE

## 2025-01-29 ENCOUNTER — HOSPITAL ENCOUNTER (OUTPATIENT)
Dept: NUCLEAR MEDICINE | Age: 70
Discharge: HOME | End: 2025-01-29
Attending: FAMILY MEDICINE
Payer: MEDICARE

## 2025-01-29 ENCOUNTER — HOSPITAL ENCOUNTER (OUTPATIENT)
Age: 70
Discharge: HOME | End: 2025-01-31
Attending: FAMILY MEDICINE
Payer: MEDICARE

## 2025-01-29 VITALS — BODY MASS INDEX: 27.38 KG/M2 | WEIGHT: 145 LBS | HEIGHT: 61 IN

## 2025-01-29 DIAGNOSIS — R09.89 ABNORMAL CHEST SOUNDS: ICD-10-CM

## 2025-01-29 DIAGNOSIS — R07.89 OTHER CHEST PAIN: ICD-10-CM

## 2025-01-29 LAB
ECHO BSA: 1.68 M2
ECHO BSA: 1.68 M2
NUC STRESS EJECTION FRACTION: 88 %
STRESS BASELINE DIAS BP: 66 MMHG
STRESS BASELINE HR: 94 BPM
STRESS BASELINE SYS BP: 143 MMHG
STRESS ESTIMATED WORKLOAD: 1 METS
STRESS PEAK DIAS BP: 66 MMHG
STRESS PEAK SYS BP: 143 MMHG
STRESS PERCENT HR ACHIEVED: 73 %
STRESS POST PEAK HR: 110 BPM
STRESS RATE PRESSURE PRODUCT: NORMAL BPM*MMHG
STRESS STAGE 1 BP: NORMAL MMHG
STRESS STAGE 1 DURATION: 1 MIN:SEC
STRESS STAGE 1 HR: 109 BPM
STRESS STAGE 2 BP: NORMAL MMHG
STRESS STAGE 2 DURATION: 1 MIN:SEC
STRESS STAGE 2 HR: 110 BPM
STRESS STAGE 3 BP: NORMAL MMHG
STRESS STAGE 3 DURATION: 1 MIN:SEC
STRESS STAGE 3 HR: 102 BPM
STRESS STAGE RECOVERY 1 BP: NORMAL MMHG
STRESS STAGE RECOVERY 1 DURATION: 1 MIN:SEC
STRESS STAGE RECOVERY 1 HR: 101 BPM
STRESS STAGE RECOVERY 2 BP: NORMAL MMHG
STRESS STAGE RECOVERY 2 DURATION: 1 MIN:SEC
STRESS STAGE RECOVERY 2 HR: 105 BPM
STRESS STAGE RECOVERY 3 BP: NORMAL MMHG
STRESS STAGE RECOVERY 3 DURATION: 1 MIN:SEC
STRESS STAGE RECOVERY 3 HR: 96 BPM
STRESS STAGE RECOVERY 4 BP: NORMAL MMHG
STRESS STAGE RECOVERY 4 DURATION: 2 MIN:SEC
STRESS STAGE RECOVERY 4 HR: 100 BPM
STRESS TARGET HR: 150 BPM

## 2025-01-29 PROCEDURE — 3430000000 HC RX DIAGNOSTIC RADIOPHARMACEUTICAL: Performed by: FAMILY MEDICINE

## 2025-01-29 PROCEDURE — A9500 TC99M SESTAMIBI: HCPCS | Performed by: FAMILY MEDICINE

## 2025-01-29 PROCEDURE — 93880 EXTRACRANIAL BILAT STUDY: CPT

## 2025-01-29 PROCEDURE — 6360000002 HC RX W HCPCS: Performed by: NUCLEAR MEDICINE

## 2025-01-29 PROCEDURE — 93017 CV STRESS TEST TRACING ONLY: CPT

## 2025-01-29 PROCEDURE — 78452 HT MUSCLE IMAGE SPECT MULT: CPT

## 2025-01-29 RX ORDER — REGADENOSON 0.08 MG/ML
0.4 INJECTION, SOLUTION INTRAVENOUS
Status: COMPLETED | OUTPATIENT
Start: 2025-01-29 | End: 2025-01-29

## 2025-01-29 RX ORDER — TETRAKIS(2-METHOXYISOBUTYLISOCYANIDE)COPPER(I) TETRAFLUOROBORATE 1 MG/ML
9.9 INJECTION, POWDER, LYOPHILIZED, FOR SOLUTION INTRAVENOUS
Status: COMPLETED | OUTPATIENT
Start: 2025-01-29 | End: 2025-01-29

## 2025-01-29 RX ORDER — TETRAKIS(2-METHOXYISOBUTYLISOCYANIDE)COPPER(I) TETRAFLUOROBORATE 1 MG/ML
34.6 INJECTION, POWDER, LYOPHILIZED, FOR SOLUTION INTRAVENOUS
Status: COMPLETED | OUTPATIENT
Start: 2025-01-29 | End: 2025-01-29

## 2025-01-29 RX ADMIN — Medication 9.9 MILLICURIE: at 07:20

## 2025-01-29 RX ADMIN — Medication 34.6 MILLICURIE: at 08:14

## 2025-01-29 RX ADMIN — REGADENOSON 0.4 MG: 0.08 INJECTION, SOLUTION INTRAVENOUS at 08:13

## 2025-02-24 ENCOUNTER — TELEPHONE (OUTPATIENT)
Dept: CASE MANAGEMENT | Age: 70
End: 2025-02-24

## 2025-02-24 NOTE — TELEPHONE ENCOUNTER
Name: Rachna Sanabria  : 1955  MRN: [id number]    Oncology Navigation- Follow-up Note:    Intake-  Contact Type: Telephone    Diagnosis: High Risk for Breast Cancer    High Risk Category:  Tyrer-Cuzick  greater than 20% with LCIS on core biopsy-right pleomorphic LCIS on 24 and surgical pathology on 10/16/24 with Dr. Ott    Needs and Barriers to Care: Coordination of Care and Knowledge deficit    Referral Source: mammogram dept-[facility]    Receptive to Risk Reduction Stategies:   yes - states \"following with Dr. Cobb. Started Arimidex end of October (1-2 weeks after breast surgery). Plan is for 5 years. Already scheduled for mammogram and dexa scan . Not going to follow with Dr. Ott at this time, unless needs surgery again.\"     Breast Imaging-    Date of Last Mammogram: 24   Amount of Fibroglandular Tissue: scattered fibroglandular    Date of Last Breast MRI: 10/8/24 (pre-op)    Personal Risk Factors Changes Since Last Contact-    Lifetime Tyrer-Cuzick Score: 31.16% per Epic, 42.3% per [facility]  Family History of Cancer: mother breast cancer age 79, colon cancer age 80-89  Meets Criteria for Genetic Evaluation: no  Hysterectomy: no  Menopausal Status: Postmenopausal-age 49  Breast Biopsies: yes - as above only  Chemoprevention: yes - Arimidex, plan for 5 years per patient, ordered by Dr. Cobb    Additional information: menses age 12, 0 pregnancy, 0 births, birth control x 12 months, no history of HRT, menopause age 49    Interventions-     Education/Screenings:  High Risk Packet: I'm High Risk: What Do I Do, Lifetime Modifications, Self Breast Exams, 3D Mammogram (Tomography), CBE, AI. Deferred ABUS to mammogram dept.         Referrals: None    Continuum of Care: Continuation of High Risk Pathway    Notes: Called patient and completed  teaching. No questions at this time. Mailed HRB packet with contact number. Encouraged her to call anytime if questions. Verbalizes

## 2025-03-27 ENCOUNTER — HOSPITAL ENCOUNTER (OUTPATIENT)
Dept: WOMENS IMAGING | Age: 70
Discharge: HOME | End: 2025-03-27
Attending: INTERNAL MEDICINE
Payer: MEDICARE

## 2025-03-27 DIAGNOSIS — Z78.0 MENOPAUSE: ICD-10-CM

## 2025-03-27 DIAGNOSIS — C50.411 MALIGNANT NEOPLASM OF UPPER-OUTER QUADRANT OF RIGHT FEMALE BREAST, UNSPECIFIED ESTROGEN RECEPTOR STATUS: ICD-10-CM

## 2025-03-27 PROCEDURE — 77080 DXA BONE DENSITY AXIAL: CPT

## 2025-03-27 PROCEDURE — G0279 TOMOSYNTHESIS, MAMMO: HCPCS

## (undated) DEVICE — SUTURE MONOCRYL SZ 4-0 L27IN ABSRB UD L19MM PS-2 1/2 CIR PRIM Y426H

## (undated) DEVICE — GLOVE ORANGE PI 7   MSG9070

## (undated) DEVICE — LIQUIBAND RAPID ADHESIVE 36/CS 0.8ML: Brand: MEDLINE

## (undated) DEVICE — SUTURE VICRYL + SZ 3-0 L27IN ABSRB UD L26MM SH 1/2 CIR VCP416H

## (undated) DEVICE — PENCIL SMK EVAC ALL IN 1 DSGN ENH VISIBILITY IMPROVED AIR

## (undated) DEVICE — SUTURE VICRYL + SZ 2-0 L27IN ABSRB WHT SH 1/2 CIR TAPERCUT VCP417H

## (undated) DEVICE — GOWN,SIRUS,NON REINFRCD,LARGE,SET IN SL: Brand: MEDLINE

## (undated) DEVICE — SPECIMEN ORIENTATION CHARMS, SIX DISTINCTLY SHAPED STERILE 10MM CHARMS: Brand: MARGINMAP

## (undated) DEVICE — APPLIER LIG CLP M L11IN TI STR RNG HNDL FOR 20 CLP DISP

## (undated) DEVICE — BREAST HERNIA: Brand: MEDLINE INDUSTRIES, INC.